# Patient Record
Sex: MALE | Race: BLACK OR AFRICAN AMERICAN | NOT HISPANIC OR LATINO | Employment: FULL TIME | URBAN - METROPOLITAN AREA
[De-identification: names, ages, dates, MRNs, and addresses within clinical notes are randomized per-mention and may not be internally consistent; named-entity substitution may affect disease eponyms.]

---

## 2019-06-15 ENCOUNTER — OFFICE VISIT (OUTPATIENT)
Dept: URGENT CARE | Facility: CLINIC | Age: 29
End: 2019-06-15

## 2019-06-15 VITALS
RESPIRATION RATE: 14 BRPM | OXYGEN SATURATION: 100 % | HEART RATE: 59 BPM | TEMPERATURE: 97 F | WEIGHT: 181 LBS | BODY MASS INDEX: 25.91 KG/M2 | HEIGHT: 70 IN

## 2019-06-15 DIAGNOSIS — L08.9 INFECTED CYST OF SKIN: Primary | ICD-10-CM

## 2019-06-15 DIAGNOSIS — L72.9 INFECTED CYST OF SKIN: Primary | ICD-10-CM

## 2019-06-15 PROCEDURE — 99203 PR OFFICE/OUTPT VISIT, NEW, LEVL III, 30-44 MIN: ICD-10-PCS | Mod: S$GLB,,, | Performed by: NURSE PRACTITIONER

## 2019-06-15 PROCEDURE — 99203 OFFICE O/P NEW LOW 30 MIN: CPT | Mod: S$GLB,,, | Performed by: NURSE PRACTITIONER

## 2019-06-15 RX ORDER — MUPIROCIN 20 MG/G
OINTMENT TOPICAL
Qty: 22 G | Refills: 1 | OUTPATIENT
Start: 2019-06-15 | End: 2022-10-15

## 2019-06-15 RX ORDER — SULFAMETHOXAZOLE AND TRIMETHOPRIM 800; 160 MG/1; MG/1
1 TABLET ORAL 2 TIMES DAILY
Qty: 10 TABLET | Refills: 0 | Status: SHIPPED | OUTPATIENT
Start: 2019-06-15 | End: 2019-06-20

## 2019-06-15 NOTE — PROCEDURES
Incision & Drainage  Date/Time: 6/15/2019 12:30 PM  Performed by: Jojo Canales NP  Authorized by: Jojo Canales NP     Consent Done?:  Yes (Verbal)    Type:  Cyst  Body area:  Upper extremity  Location details:  Left long finger  Anesthesia:  Local infiltration  Local anesthetic: lidocaine 1% without epinephrine  Anesthetic total (ml):  2  Scalpel size:  11  Incision type:  Single straight  Complexity:  Simple  Drainage:  Pus  Drainage amount:  Scant  Wound treatment:  Incision and drainage (unable to locate capsule)  Patient tolerance:  Patient tolerated the procedure well with no immediate complications    Dressed coban and gauze

## 2019-06-15 NOTE — PATIENT INSTRUCTIONS
APPLY OINTMENT 2-3 TIMES PER DAY   KEEP COVERED  FOLLOW UP WITH DERM FOR REMOVAL OF CYST IF IT REOCCURS    You must understand that you've received an Urgent Care treatment only and that you may be released before all your medical problems are known or treated. You, the patient, will arrange for follow up care as instructed.  If your condition worsens we recommend that you receive another evaluation at the emergency room immediately or contact your primary medical clinics after hours call service to discuss your concerns.  Please return here or go to the Emergency Department for any concerns or worsening of condition.      Understanding Epidermoid Cyst    An epidermoid cyst is a small abnormal growth in the top layers of the skin. It is filled with keratin, the same proteins that make up your hair and nails. These cysts grow slowly. They can grow anywhere on the body. But they are most often found on the face, behind the ears, and on the chest or upper back.  How to say it  [id number] sist   What causes an epidermoid cyst?  An epidermoid cyst may occur because of an injury to the skin or from acne.  Symptoms of an epidermoid cyst  An epidermoid cyst is a subcutaneous bump. This means it is just below the skin. It may be yellow or skin-colored. It often has a small black bienvenido in the middle of it, like a blackhead.  An epidermoid cyst rarely causes pain, unless it ruptures or becomes infected. It may ooze keratin, a white, cheesy, smelly material. If the cyst becomes inflamed or infected, it may be:  · Bad smelling  · Red  · Swollen  · Tender  Treatment for an epidermoid cyst  Many epidermoid cysts dont cause any problems. They dont necessarily need to be treated. They may go away on their own. But you may want to treat it if you dont like how it looks or it becomes inflamed.  Treatment options include:  · Steroid injection. A steroid may be injected into the cyst. This may help ease inflammation. It may also  prevent infection.  · Surgical removal. The cyst can be cut out. It may also need to be drained first. There is a slight chance the cyst may come back.  · Antibiotics. In some cases, you may need to take an oral antibiotic to prevent infection and regrowth.  When to call your healthcare provider  Call your healthcare provider right away if you have any of these:  · Fever of 100.4°F (38°C) or higher, or as directed  · Redness, swelling, or fluid leaking from your incision that gets worse  · Pain that gets worse  · Symptoms that dont get better, or get worse  · New symptoms   Date Last Reviewed: 5/1/2016  © 7959-9144 [employer]. [address] [place] [address], [place] [address]. All rights reserved. This information is not intended as a substitute for professional medical care. Always follow your healthcare professional's instructions.

## 2019-06-15 NOTE — PROGRESS NOTES
"Subjective:       Patient ID: Paulo Bahena is a 28 y.o. male.    Vitals:  height is 5' 10" (1.778 m) and weight is 82.1 kg (181 lb). His oral temperature is 97.4 °F (36.3 °C). His pulse is 59 (abnormal). His respiration is 14 and oxygen saturation is 100%.     Chief Complaint: Wound Infection (left 3rd finger )    This is a 28 year old male who presents today with complaints of a bump to his finger that has been there for the past 6 months. He states last week it had a cheese like material come out and now it is tender and painful. It is located to his left 3rd digit.    Rash   This is a new problem. The current episode started yesterday. The problem has been gradually worsening since onset. The affected locations include the left fingers and left axilla. The rash is characterized by draining, redness, swelling and itchiness. He was exposed to nothing. Pertinent negatives include no congestion, cough, diarrhea, fatigue, fever, shortness of breath, sore throat or vomiting. Past treatments include antibiotic cream. The treatment provided mild relief.       Constitution: Negative for chills, fatigue and fever.   HENT: Negative for congestion and sore throat.    Neck: Negative for painful lymph nodes.   Cardiovascular: Negative for chest pain and leg swelling.   Eyes: Negative for double vision and blurred vision.   Respiratory: Negative for cough and shortness of breath.    Gastrointestinal: Negative for nausea, vomiting and diarrhea.   Genitourinary: Negative for dysuria, frequency and urgency.   Musculoskeletal: Negative for joint pain, joint swelling, muscle cramps and muscle ache.   Skin: Positive for erythema and abscess (left 3rd finger ). Negative for color change, pale and rash.   Allergic/Immunologic: Negative for seasonal allergies.   Neurological: Negative for dizziness, history of vertigo, light-headedness, passing out and headaches.   Hematologic/Lymphatic: Negative for swollen lymph nodes, easy " bruising/bleeding and history of blood clots. Does not bruise/bleed easily.   Psychiatric/Behavioral: Negative for nervous/anxious, sleep disturbance and depression. The patient is not nervous/anxious.        Objective:      Physical Exam   Constitutional: He is oriented to person, place, and time. He appears well-developed and well-nourished.   HENT:   Head: Normocephalic and atraumatic. Head is without abrasion, without contusion and without laceration.   Right Ear: External ear normal.   Left Ear: External ear normal.   Nose: Nose normal.   Mouth/Throat: Oropharynx is clear and moist.   Eyes: Pupils are equal, round, and reactive to light. Conjunctivae, EOM and lids are normal.   Neck: Trachea normal, full passive range of motion without pain and phonation normal. Neck supple.   Cardiovascular: Normal rate, regular rhythm and normal heart sounds.   Pulmonary/Chest: Effort normal and breath sounds normal. No stridor. No respiratory distress.   Musculoskeletal: Normal range of motion.   Neurological: He is alert and oriented to person, place, and time.   Skin: Skin is warm, dry and intact. Capillary refill takes less than 2 seconds. No abrasion, no bruising, no burn, no ecchymosis, no laceration, no lesion and no rash noted. There is erythema.   Skin colored pea sized nodule noted to proximal left third phalanx with tenderness to palpation and small area of fluctuance.   No streaking, no swelling noted.    Psychiatric: He has a normal mood and affect. His speech is normal and behavior is normal. Judgment and thought content normal. Cognition and memory are normal.   Nursing note and vitals reviewed.      Assessment:       1. Infected cyst of skin        Plan:         Infected cyst of skin  -     sulfamethoxazole-trimethoprim 800-160mg (BACTRIM DS) 800-160 mg Tab; Take 1 tablet by mouth 2 (two) times daily. for 5 days  Dispense: 10 tablet; Refill: 0  -     mupirocin (BACTROBAN) 2 % ointment; Apply to affected area 3  times daily  Dispense: 22 g; Refill: 1  -     Incision & Drainage          .  If you were prescribed a narcotic medication, do not drive or operate heavy equipment or machinery while taking these medications.  You must understand that you've received an Urgent Care treatment only and that you may be released before all your medical problems are known or treated. You, the patient, will arrange for follow up care as instructed.  If your condition worsens we recommend that you receive another evaluation at the emergency room immediately or contact your primary medical clinics after hours call service to discuss your concerns.  Please return here or go to the Emergency Department for any concerns or worsening of condition.    Understanding Epidermoid Cyst    An epidermoid cyst is a small abnormal growth in the top layers of the skin. It is filled with keratin, the same proteins that make up your hair and nails. These cysts grow slowly. They can grow anywhere on the body. But they are most often found on the face, behind the ears, and on the chest or upper back.  How to say it  [id number]-ak-BHTY-moid sist   What causes an epidermoid cyst?  An epidermoid cyst may occur because of an injury to the skin or from acne.  Symptoms of an epidermoid cyst  An epidermoid cyst is a subcutaneous bump. This means it is just below the skin. It may be yellow or skin-colored. It often has a small black bienvenido in the middle of it, like a blackhead.  An epidermoid cyst rarely causes pain, unless it ruptures or becomes infected. It may ooze keratin, a white, cheesy, smelly material. If the cyst becomes inflamed or infected, it may be:  · Bad smelling  · Red  · Swollen  · Tender  Treatment for an epidermoid cyst  Many epidermoid cysts dont cause any problems. They dont necessarily need to be treated. They may go away on their own. But you may want to treat it if you dont like how it looks or it becomes inflamed.  Treatment options  include:  · Steroid injection. A steroid may be injected into the cyst. This may help ease inflammation. It may also prevent infection.  · Surgical removal. The cyst can be cut out. It may also need to be drained first. There is a slight chance the cyst may come back.  · Antibiotics. In some cases, you may need to take an oral antibiotic to prevent infection and regrowth.  When to call your healthcare provider  Call your healthcare provider right away if you have any of these:  · Fever of 100.4°F (38°C) or higher, or as directed  · Redness, swelling, or fluid leaking from your incision that gets worse  · Pain that gets worse  · Symptoms that dont get better, or get worse  · New symptoms   Date Last Reviewed: 5/1/2016  © 2999-0766 [employer] [address] [place] [address], [place] [address]. All rights reserved. This information is not intended as a substitute for professional medical care. Always follow your healthcare professional's instructions.

## 2021-02-13 ENCOUNTER — HOSPITAL ENCOUNTER (EMERGENCY)
Facility: HOSPITAL | Age: 31
Discharge: HOME | End: 2021-02-13
Attending: EMERGENCY MEDICINE
Payer: COMMERCIAL

## 2021-02-13 VITALS
HEART RATE: 88 BPM | TEMPERATURE: 98 F | HEIGHT: 70 IN | RESPIRATION RATE: 16 BRPM | SYSTOLIC BLOOD PRESSURE: 125 MMHG | OXYGEN SATURATION: 98 % | WEIGHT: 189 LBS | DIASTOLIC BLOOD PRESSURE: 82 MMHG | BODY MASS INDEX: 27.06 KG/M2

## 2021-02-13 DIAGNOSIS — S16.1XXA STRAIN OF NECK MUSCLE, INITIAL ENCOUNTER: ICD-10-CM
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
## 2021-02-13 PROCEDURE — 99284 EMERGENCY DEPT VISIT MOD MDM: CPT | Mod: ,,, | Performed by: PHYSICIAN ASSISTANT

## 2021-02-13 PROCEDURE — 99284 PR EMERGENCY DEPT VISIT,LEVEL IV: ICD-10-PCS | Mod: ,,, | Performed by: PHYSICIAN ASSISTANT

## 2021-02-13 PROCEDURE — 99284 EMERGENCY DEPT VISIT MOD MDM: CPT

## 2021-02-13 RX ORDER — METHOCARBAMOL 750 MG/1
750 TABLET, FILM COATED ORAL 3 TIMES DAILY PRN
Qty: 15 TABLET | Refills: 0 | Status: SHIPPED | OUTPATIENT
Start: 2021-02-13 | End: 2021-02-18

## 2021-02-13 RX ORDER — LIDOCAINE 50 MG/G
1 PATCH TOPICAL DAILY
Qty: 7 PATCH | Refills: 0 | OUTPATIENT
Start: 2021-02-13 | End: 2022-10-15

## 2021-02-13 RX ORDER — NAPROXEN 500 MG/1
500 TABLET ORAL 2 TIMES DAILY WITH MEALS
Qty: 10 TABLET | Refills: 0 | OUTPATIENT
Start: 2021-02-13 | End: 2022-10-15

## 2022-10-15 ENCOUNTER — HOSPITAL ENCOUNTER (EMERGENCY)
Facility: HOSPITAL | Age: 32
Discharge: HOME | End: 2022-10-15
Attending: EMERGENCY MEDICINE
Payer: COMMERCIAL

## 2022-10-15 VITALS
HEIGHT: 71 IN | OXYGEN SATURATION: 98 % | RESPIRATION RATE: 16 BRPM | HEART RATE: 69 BPM | TEMPERATURE: 98 F | BODY MASS INDEX: 28 KG/M2 | SYSTOLIC BLOOD PRESSURE: 118 MMHG | WEIGHT: 200 LBS | DIASTOLIC BLOOD PRESSURE: 70 MMHG

## 2022-10-15 DIAGNOSIS — T14.8XXA MUSCLE STRAIN: ICD-10-CM

## 2022-10-15 DIAGNOSIS — S09.90XA INJURY OF HEAD, INITIAL ENCOUNTER: ICD-10-CM
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
## 2022-10-15 PROCEDURE — 99284 PR EMERGENCY DEPT VISIT,LEVEL IV: ICD-10-PCS | Mod: ,,, | Performed by: EMERGENCY MEDICINE

## 2022-10-15 PROCEDURE — 99284 EMERGENCY DEPT VISIT MOD MDM: CPT | Mod: ,,, | Performed by: EMERGENCY MEDICINE

## 2022-10-15 PROCEDURE — 99284 EMERGENCY DEPT VISIT MOD MDM: CPT | Mod: 25

## 2022-10-15 RX ORDER — METHOCARBAMOL 500 MG/1
1000 TABLET, FILM COATED ORAL 3 TIMES DAILY PRN
Qty: 30 TABLET | Refills: 0 | Status: SHIPPED | OUTPATIENT
Start: 2022-10-15 | End: 2022-10-20

## 2022-10-15 RX ORDER — IBUPROFEN 600 MG/1
600 TABLET ORAL EVERY 6 HOURS PRN
Qty: 20 TABLET | Refills: 0 | Status: SHIPPED | OUTPATIENT
Start: 2022-10-15
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
## 2023-07-04 ENCOUNTER — HOSPITAL ENCOUNTER (EMERGENCY)
Facility: HOSPITAL | Age: 33
Discharge: HOME | End: 2023-07-04
Attending: EMERGENCY MEDICINE
Payer: COMMERCIAL

## 2023-07-04 VITALS
SYSTOLIC BLOOD PRESSURE: 125 MMHG | HEIGHT: 71 IN | OXYGEN SATURATION: 99 % | DIASTOLIC BLOOD PRESSURE: 80 MMHG | TEMPERATURE: 98 F | RESPIRATION RATE: 18 BRPM | WEIGHT: 190 LBS | HEART RATE: 80 BPM | BODY MASS INDEX: 26.6 KG/M2

## 2023-07-04 DIAGNOSIS — S61.411A LACERATION OF RIGHT HAND WITHOUT FOREIGN BODY, INITIAL ENCOUNTER: Primary | ICD-10-CM

## 2023-07-04 PROCEDURE — 99284 EMERGENCY DEPT VISIT MOD MDM: CPT | Mod: 25

## 2023-07-04 PROCEDURE — 25000003 PHARM REV CODE 250

## 2023-07-04 PROCEDURE — 90471 IMMUNIZATION ADMIN: CPT

## 2023-07-04 PROCEDURE — 63600175 PHARM REV CODE 636 W HCPCS

## 2023-07-04 PROCEDURE — 12001 RPR S/N/AX/GEN/TRNK 2.5CM/<: CPT

## 2023-07-04 PROCEDURE — 90715 TDAP VACCINE 7 YRS/> IM: CPT

## 2023-07-04 RX ORDER — LIDOCAINE HYDROCHLORIDE 10 MG/ML
5 INJECTION, SOLUTION EPIDURAL; INFILTRATION; INTRACAUDAL; PERINEURAL
Status: COMPLETED | OUTPATIENT
Start: 2023-07-04 | End: 2023-07-04

## 2023-07-04 RX ADMIN — TETANUS TOXOID, REDUCED DIPHTHERIA TOXOID AND ACELLULAR PERTUSSIS VACCINE, ADSORBED 0.5 ML: 5; 2.5; 8; 8; 2.5 SUSPENSION INTRAMUSCULAR at 08:07

## 2023-07-04 RX ADMIN — LIDOCAINE HYDROCHLORIDE 50 MG: 10 INJECTION, SOLUTION EPIDURAL; INFILTRATION; INTRACAUDAL at 08:07

## 2023-07-04 NOTE — Clinical Note
"Paulo "[place] Bahena was seen and treated in our emergency department on 7/4/2023.  He may return to work on 07/05/2023.  No heavy lifting for 1 week     If you have any questions or concerns, please don't hesitate to call.      Wlilis Miller, DO"
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
## 2023-07-04 NOTE — Clinical Note
"[place] "Ritesh Bahena was seen and treated in our emergency department on 7/4/2023.  He may return to work on 07/07/2023.       If you have any questions or concerns, please don't hesitate to call.      Willis Miller, DO"

## 2023-07-04 NOTE — DISCHARGE INSTRUCTIONS
1). Watch for redness, drainage, warmth, tenderness, or fever and return to ED or see primary care physician if any of those sign/symptoms develop. If you have any concerns, please follow-up with your primary physician or return to the ED.    2). Sutures will be need to be removed in 7-10 days. We recommend a physician or a qualified healthcare worker to remove all sutures, please followup with your primary care physician for suture removal.    3). Keep wound clean and dry for the next 24 hours, then may shower and/or clean gently with water. Apply antibiotic ointment twice daily for the next week.     4). Please avoid any submersion of wound in water (to include baths, pools, hot tubs, lakes) until sutures removed.    5). Please apply sunscreen to the area after sutures removed for the next year as sunscreen my aid in reducing the appearance of scaring.

## 2023-07-04 NOTE — ED NOTES
Discharge home with family, states understanding to follow up as directed. Ambulates out of ED without difficulty.Extra supplies given , dressing applied with ace wrap per physician staff

## 2023-07-04 NOTE — Clinical Note
"[place] "Ritesh Bahena was seen and treated in our emergency department on 7/4/2023.  He may return to work on 07/07/2023.       If you have any questions or concerns, please don't hesitate to call.      Willis Miller, DO"

## 2023-07-04 NOTE — ED PROVIDER NOTES
Encounter Date: 7/4/2023       History     Chief Complaint   Patient presents with    Laceration     Lac to top of r hand , bleeding controlled     HPI  Paulo Bahena is a 32-year-old male with no significant medical history presenting with laceration to the top of his right hand.  Patient states he was at work this morning approximately 5:00 a.m. when a box full of glass dropped on the ground, shadowing and striking his right hand.  He is a small laceration approximately 2 cm to the top of his right hand.  He denies any weakness, paresthesias, paralysis, numbness or severe bleeding.  His pain is moderate severe.  Symptoms are acute and moderate.  He is right-handed.  He reports his last tetanus was 10 years ago.  No other aggravating or alleviating factors.    Review of patient's allergies indicates:  No Known Allergies  History reviewed. No pertinent past medical history.  Past Surgical History:   Procedure Laterality Date    NO PAST SURGERIES       Family History   Problem Relation Age of Onset    Heart disease Father         60s    Diabetes Father     Hypertension Father     Diabetes Maternal Grandfather      Social History     Tobacco Use    Smoking status: Some Days     Types: Cigars    Smokeless tobacco: Never   Substance Use Topics    Alcohol use: Yes     Comment: occasional    Drug use: No     Review of Systems  All other systems reviewed and were negative; see HPI also for additional ROS.    Physical Exam     Initial Vitals [07/04/23 0802]   BP Pulse Resp Temp SpO2   125/80 80 18 98.4 °F (36.9 °C) 99 %      MAP       --         Physical Exam    Nursing note and vitals reviewed.    Gen/Constitutional: Interactive. No acute distress  Head: Normocephalic, Atraumatic  Neck: supple, no masses or LAD, no JVD  Eyes: PERRLA, conjunctiva clear  Ears, Nose and Throat: No rhinorrhea or stridor.  Cardiac:  Regular rate, Reg Rhythm, No murmur  Pulmonary: CTA Bilat, no wheezes, rhonchi, rales.  No increased work of  breathing.  GI: Abdomen soft, non-tender, non-distended; no rebound or guarding  : No CVA tenderness.  Musculoskeletal: Extremities warm, well perfused, no erythema, no edema  Right hand:  2 cm superficial laceration to the dorsum r without active bleeding.  Neurovascular intact, sensory intact to light touch, 2+distal pulses, soft compartments  Skin: No rashes, cyanosis or jaundice.  Neuro: Alert and Oriented x 3; No focal motor or sensory deficits.    Psych: Normal affect      ED Course   Lac Repair    Date/Time: 7/4/2023 8:55 AM  Performed by: Willis Miller DO  Authorized by: Willis Miller DO     Consent:     Consent obtained:  Verbal    Consent given by:  Patient    Risks, benefits, and alternatives were discussed: yes      Risks discussed:  Infection and need for additional repair    Alternatives discussed:  No treatment and delayed treatment  Universal protocol:     Procedure explained and questions answered to patient or proxy's satisfaction: yes      Patient identity confirmed:  Verbally with patient, arm band, provided demographic data and hospital-assigned identification number  Anesthesia:     Anesthesia method:  Local infiltration    Local anesthetic:  Lidocaine 1% w/o epi  Laceration details:     Location:  Hand    Hand location:  R hand, dorsum    Length (cm):  2  Pre-procedure details:     Preparation:  Patient was prepped and draped in usual sterile fashion  Exploration:     Limited defect created (wound extended): no      Hemostasis achieved with:  Direct pressure    Imaging outcome: foreign body not noted      Wound exploration: entire depth of wound visualized      Wound extent: areolar tissue violated      Wound extent: no fascia violation noted, no foreign bodies/material noted, no nerve damage noted, no tendon damage noted, no underlying fracture noted and no vascular damage noted    Treatment:     Area cleansed with:  Chlorhexidine and saline    Amount of cleaning:  Standard     Irrigation solution:  Sterile saline    Irrigation volume:  250    Irrigation method:  Pressure wash    Visualized foreign bodies/material removed: no      Debridement:  None  Skin repair:     Repair method:  Sutures    Suture size:  4-0    Suture material:  Nylon    Suture technique:  Simple interrupted    Number of sutures:  3  Approximation:     Approximation:  Close  Repair type:     Repair type:  Simple  Post-procedure details:     Dressing:  Sterile dressing    Procedure completion:  Tolerated  Labs Reviewed   HIV 1 / 2 ANTIBODY   HEPATITIS C ANTIBODY          Imaging Results    None          Medications   LIDOcaine (PF) 10 mg/ml (1%) injection 50 mg (50 mg Infiltration Given by Other 7/4/23 3332)   Tdap (BOOSTRIX) vaccine injection 0.5 mL (0.5 mLs Intramuscular Given 7/4/23 0138)     Medical Decision Making:   History:   Old Medical Records: I decided to obtain old medical records.  Initial Assessment:   Paulo Bahena is a 32-year-old male with no significant medical history presenting with laceration to the top of his right hand.    Differential Diagnosis:   Superficial laceration, deep laceration, vascular injury, tendon injury     Afebrile vital signs stable.  Physical exam findings remarkable for 2 cm laceration on the dorsum of the right hand.  This is a superficial laceration with mild areolar disruption, no tendon or underlying vascular or nerve injury.  Entirety of the wound was evaluated with no obvious foreign bodies.  Wound was thoroughly irrigated and closed per the above procedure note.  Patient's tetanus status was updated.  Patient was right-handed and neurovascular intact.  Please see procedure note for detail.  Strict ED precautions return instructions were discussed, and follow-up in 1 week for suture removal with urgent care, PCP or ED. Patient agreeable to discharge plan. Strict ED precautions and return instructions discussed at length and patient verbalized understanding. All questions  were answered and ample time was given for questions.      Complexity:  Moderate risk                     Clinical Impression:   Final diagnoses:  [S63.188M] Laceration of right hand without foreign body, initial encounter (Primary)        ED Disposition Condition    Discharge Stable          ED Prescriptions    None       Follow-up Information       Follow up With Specialties Details Why Contact Info    [facility]  Schedule an appointment as soon as possible for a visit in 1 week For suture removal [address] [facility] [address]  [phone]      Any urgent care  Go in 1 week For suture removal             Willis Miller DO, FAAEM  Emergency Staff Physician   Dept of Emergency Medicine   [facility]  Spectralink: [id number]        Disclaimer: This note has been generated using voice-recognition software. There may be typographical errors that have been missed during proof-reading.       Willis Miller DO  07/04/23 0955

## 2023-07-16 ENCOUNTER — HOSPITAL ENCOUNTER (EMERGENCY)
Facility: HOSPITAL | Age: 33
Discharge: HOME | End: 2023-07-16
Attending: EMERGENCY MEDICINE
Payer: COMMERCIAL

## 2023-07-16 VITALS
HEIGHT: 71 IN | SYSTOLIC BLOOD PRESSURE: 112 MMHG | OXYGEN SATURATION: 98 % | WEIGHT: 190 LBS | DIASTOLIC BLOOD PRESSURE: 71 MMHG | RESPIRATION RATE: 16 BRPM | BODY MASS INDEX: 26.6 KG/M2 | HEART RATE: 66 BPM | TEMPERATURE: 98 F

## 2023-07-16 DIAGNOSIS — Z48.02 VISIT FOR SUTURE REMOVAL: Primary | ICD-10-CM

## 2023-07-16 PROCEDURE — 99282 EMERGENCY DEPT VISIT SF MDM: CPT

## 2023-07-16 NOTE — ED PROVIDER NOTES
Encounter Date: 7/16/2023       History     Chief Complaint   Patient presents with    Suture / Staple Removal     R hand     32-year-old male with no significant past medical history presents requesting removal of sutures to posterior right hand.  Sutures were placed on 07/04/2023 after piece of glass cut his hand.  Patient denies any redness, pain or swelling.  Patient denies any loss of range of motion of hand or numbness.    The history is provided by the patient.   Review of patient's allergies indicates:  No Known Allergies  No past medical history on file.  Past Surgical History:   Procedure Laterality Date    NO PAST SURGERIES       Family History   Problem Relation Age of Onset    Heart disease Father         60s    Diabetes Father     Hypertension Father     Diabetes Maternal Grandfather      Social History     Tobacco Use    Smoking status: Some Days     Types: Cigars    Smokeless tobacco: Never   Substance Use Topics    Alcohol use: Yes     Comment: occasional    Drug use: No     Review of Systems   Constitutional:  Negative for chills and fever.   Musculoskeletal:  Negative for myalgias.   Skin:  Positive for wound. Negative for color change and rash.   Neurological:  Negative for weakness and numbness.   All other systems reviewed and are negative.    Physical Exam     Initial Vitals [07/16/23 0921]   BP Pulse Resp Temp SpO2   112/71 66 16 98.3 °F (36.8 °C) 98 %      MAP       --         Physical Exam    Nursing note and vitals reviewed.  Constitutional: He appears well-developed and well-nourished. He is not diaphoretic. No distress.   HENT:   Head: Normocephalic and atraumatic.   Right Ear: External ear normal.   Left Ear: External ear normal.   Eyes: Conjunctivae and EOM are normal.   Neck:   Normal range of motion.  Pulmonary/Chest: No respiratory distress.   Musculoskeletal:      Cervical back: Normal range of motion.      Comments: Right hand dorsum:  1.5 cm well-healed wound, no erythema, no  edema, no tenderness to palpation     Neurological: He is alert and oriented to person, place, and time.   Skin: Skin is warm. Capillary refill takes less than 2 seconds.   Psychiatric: He has a normal mood and affect. Thought content normal.       ED Course   Suture Removal    Date/Time: 7/16/2023 9:39 AM  Location procedure was performed: [facility] EMERGENCY DEPARTMENT  Performed by: Zafar Peters MD  Authorized by: Zafar Peters MD   Body area: upper extremity  Location details: right hand  Wound Appearance: clean and well healed  Sutures Removed: 3  Post-removal: no dressing applied  Complications: No      Labs Reviewed - No data to display       Imaging Results    None          Medications - No data to display  Medical Decision Making:   History:   Old Medical Records: I decided to obtain old medical records.  Differential Diagnosis:   Dehiscence, well-healing wound  ED Management:  Patient discharged home in stable condition. Diagnosis and treatment plan explained to patient. No further workup indicated based on their complaints or examination today. Discussed results with the patient. I educated the patient/guardian on the warning signs and symptoms for which they must seek immediate medical attention. All questions addressed and patient/guardian were given discharge instructions and followup information.                         Clinical Impression:   Final diagnoses:  [Z48.02] Visit for suture removal (Primary)        ED Disposition Condition    Discharge Stable          ED Prescriptions    None       Follow-up Information       Follow up With Specialties Details Why Contact [facility] - Emergency Dept Emergency Medicine  As needed, for any new or worsening symptoms [address]  [facility] [id number] [phone]    Your Primary Care Provider  Schedule an appointment as soon as possible for a visit in 3 days For follow-up and re-evaluation              Zafar Peters MD  07/16/23  2306

## 2023-07-16 NOTE — Clinical Note
"Paulo Raglander" Josef was seen and treated in our emergency department on 7/16/2023.  He may return to work on 07/17/2023.  No heavy lifting x 1 week     If you have any questions or concerns, please don't hesitate to call.      Dr Peters/ ELOY Polanco    "

## 2023-07-16 NOTE — ED NOTES
LOC: The patient is awake and alert; oriented x 3 and speaking appropriately.  APPEARANCE: Patient resting comfortably, patient is clean and well groomed  SKIN: warm and dry, normal skin turgor & moist mucus membranes, skin intact, no breakdown noted.Healing suture line rt hand  MUSCULOSKELETAL: Patient moving all extremities well, no obvious swelling or deformities noted  RESPIRATORY: Airway is open and patent,  respirations are spontaneous, normal effort and rate  CARDIAC: Patient has a normal rate, no peripheral edema noted, capillary refill < 3 seconds; No complaints of chest pain   ABDOMEN: Soft and non tender to palpation, no distention noted.

## 2023-10-16 ENCOUNTER — OCCUPATIONAL HEALTH (OUTPATIENT)
Dept: URGENT CARE | Facility: CLINIC | Age: 33
End: 2023-10-16

## 2023-10-16 DIAGNOSIS — Z02.83 ENCOUNTER FOR DRUG SCREENING: Primary | ICD-10-CM

## 2023-10-16 LAB
CTP QC/QA: YES
POC 5 PANEL DRUG SCREEN: NEGATIVE

## 2023-10-16 PROCEDURE — 80305 POCT RAPID DRUG SCREEN 5 PANEL: ICD-10-PCS | Mod: S$GLB,,, | Performed by: SURGERY

## 2023-10-16 PROCEDURE — 80305 DRUG TEST PRSMV DIR OPT OBS: CPT | Mod: S$GLB,,, | Performed by: SURGERY
